# Patient Record
Sex: FEMALE
[De-identification: names, ages, dates, MRNs, and addresses within clinical notes are randomized per-mention and may not be internally consistent; named-entity substitution may affect disease eponyms.]

---

## 2019-02-27 ENCOUNTER — RESULT REVIEW (OUTPATIENT)
Age: 59
End: 2019-02-27

## 2020-07-04 ENCOUNTER — HOSPITAL ENCOUNTER (EMERGENCY)
Facility: HOSPITAL | Age: 60
Discharge: HOME | End: 2020-07-04
Attending: EMERGENCY MEDICINE
Payer: COMMERCIAL

## 2020-07-04 ENCOUNTER — APPOINTMENT (EMERGENCY)
Dept: CT IMAGING | Facility: HOSPITAL | Age: 60
End: 2020-07-04
Payer: COMMERCIAL

## 2020-07-04 VITALS
WEIGHT: 132.72 LBS | DIASTOLIC BLOOD PRESSURE: 68 MMHG | RESPIRATION RATE: 18 BRPM | HEART RATE: 63 BPM | OXYGEN SATURATION: 98 % | HEIGHT: 60 IN | BODY MASS INDEX: 26.06 KG/M2 | SYSTOLIC BLOOD PRESSURE: 116 MMHG | TEMPERATURE: 98.8 F

## 2020-07-04 DIAGNOSIS — R10.9 ABDOMINAL PAIN: Primary | ICD-10-CM

## 2020-07-04 DIAGNOSIS — N20.0 KIDNEY STONE: ICD-10-CM

## 2020-07-04 LAB
ALBUMIN SERPL BCP-MCNC: 3.8 G/DL (ref 3.5–5)
ALP SERPL-CCNC: 85 U/L (ref 46–116)
ALT SERPL W P-5'-P-CCNC: 25 U/L (ref 12–78)
AMORPH PHOS CRY URNS QL MICRO: ABNORMAL /HPF
ANION GAP SERPL CALCULATED.3IONS-SCNC: 9 MMOL/L (ref 4–13)
AST SERPL W P-5'-P-CCNC: 18 U/L (ref 5–45)
BACTERIA UR QL AUTO: ABNORMAL /HPF
BASOPHILS # BLD AUTO: 0.05 THOUSANDS/ΜL (ref 0–0.1)
BASOPHILS NFR BLD AUTO: 0 % (ref 0–1)
BILIRUB SERPL-MCNC: 0.4 MG/DL (ref 0.2–1)
BILIRUB UR QL STRIP: NEGATIVE
BUN SERPL-MCNC: 9 MG/DL (ref 5–25)
CALCIUM SERPL-MCNC: 8.7 MG/DL (ref 8.3–10.1)
CHLORIDE SERPL-SCNC: 98 MMOL/L (ref 100–108)
CLARITY UR: CLEAR
CO2 SERPL-SCNC: 25 MMOL/L (ref 21–32)
COLOR UR: YELLOW
CREAT SERPL-MCNC: 0.89 MG/DL (ref 0.6–1.3)
EOSINOPHIL # BLD AUTO: 0.01 THOUSAND/ΜL (ref 0–0.61)
EOSINOPHIL NFR BLD AUTO: 0 % (ref 0–6)
ERYTHROCYTE [DISTWIDTH] IN BLOOD BY AUTOMATED COUNT: 12.2 % (ref 11.6–15.1)
GFR SERPL CREATININE-BSD FRML MDRD: 72 ML/MIN/1.73SQ M
GLUCOSE SERPL-MCNC: 138 MG/DL (ref 65–140)
GLUCOSE UR STRIP-MCNC: ABNORMAL MG/DL
HCT VFR BLD AUTO: 40.1 % (ref 34.8–46.1)
HGB BLD-MCNC: 13.4 G/DL (ref 11.5–15.4)
HGB UR QL STRIP.AUTO: ABNORMAL
IMM GRANULOCYTES # BLD AUTO: 0.05 THOUSAND/UL (ref 0–0.2)
IMM GRANULOCYTES NFR BLD AUTO: 0 % (ref 0–2)
KETONES UR STRIP-MCNC: ABNORMAL MG/DL
LEUKOCYTE ESTERASE UR QL STRIP: NEGATIVE
LIPASE SERPL-CCNC: 98 U/L (ref 73–393)
LYMPHOCYTES # BLD AUTO: 1.41 THOUSANDS/ΜL (ref 0.6–4.47)
LYMPHOCYTES NFR BLD AUTO: 10 % (ref 14–44)
MCH RBC QN AUTO: 29.3 PG (ref 26.8–34.3)
MCHC RBC AUTO-ENTMCNC: 33.4 G/DL (ref 31.4–37.4)
MCV RBC AUTO: 88 FL (ref 82–98)
MONOCYTES # BLD AUTO: 0.75 THOUSAND/ΜL (ref 0.17–1.22)
MONOCYTES NFR BLD AUTO: 5 % (ref 4–12)
NEUTROPHILS # BLD AUTO: 12.25 THOUSANDS/ΜL (ref 1.85–7.62)
NEUTS SEG NFR BLD AUTO: 85 % (ref 43–75)
NITRITE UR QL STRIP: NEGATIVE
NON-SQ EPI CELLS URNS QL MICRO: ABNORMAL /HPF
NRBC BLD AUTO-RTO: 0 /100 WBCS
PH UR STRIP.AUTO: 8.5 [PH] (ref 4.5–8)
PLATELET # BLD AUTO: 204 THOUSANDS/UL (ref 149–390)
PMV BLD AUTO: 10.8 FL (ref 8.9–12.7)
POTASSIUM SERPL-SCNC: 3 MMOL/L (ref 3.5–5.3)
PROT SERPL-MCNC: 7.6 G/DL (ref 6.4–8.2)
PROT UR STRIP-MCNC: NEGATIVE MG/DL
RBC # BLD AUTO: 4.58 MILLION/UL (ref 3.81–5.12)
RBC #/AREA URNS AUTO: ABNORMAL /HPF
SODIUM SERPL-SCNC: 132 MMOL/L (ref 136–145)
SP GR UR STRIP.AUTO: 1.02 (ref 1–1.03)
UROBILINOGEN UR QL STRIP.AUTO: 0.2 E.U./DL
WBC # BLD AUTO: 14.52 THOUSAND/UL (ref 4.31–10.16)
WBC #/AREA URNS AUTO: ABNORMAL /HPF

## 2020-07-04 PROCEDURE — 99285 EMERGENCY DEPT VISIT HI MDM: CPT | Performed by: PHYSICIAN ASSISTANT

## 2020-07-04 PROCEDURE — 80053 COMPREHEN METABOLIC PANEL: CPT | Performed by: PHYSICIAN ASSISTANT

## 2020-07-04 PROCEDURE — 81001 URINALYSIS AUTO W/SCOPE: CPT

## 2020-07-04 PROCEDURE — 36415 COLL VENOUS BLD VENIPUNCTURE: CPT | Performed by: PHYSICIAN ASSISTANT

## 2020-07-04 PROCEDURE — 87086 URINE CULTURE/COLONY COUNT: CPT | Performed by: PHYSICIAN ASSISTANT

## 2020-07-04 PROCEDURE — 83690 ASSAY OF LIPASE: CPT | Performed by: PHYSICIAN ASSISTANT

## 2020-07-04 PROCEDURE — 99284 EMERGENCY DEPT VISIT MOD MDM: CPT

## 2020-07-04 PROCEDURE — 96375 TX/PRO/DX INJ NEW DRUG ADDON: CPT

## 2020-07-04 PROCEDURE — 85025 COMPLETE CBC W/AUTO DIFF WBC: CPT | Performed by: PHYSICIAN ASSISTANT

## 2020-07-04 PROCEDURE — 96374 THER/PROPH/DIAG INJ IV PUSH: CPT

## 2020-07-04 PROCEDURE — 74177 CT ABD & PELVIS W/CONTRAST: CPT

## 2020-07-04 PROCEDURE — 81003 URINALYSIS AUTO W/O SCOPE: CPT

## 2020-07-04 RX ORDER — POTASSIUM CHLORIDE 20 MEQ/1
40 TABLET, EXTENDED RELEASE ORAL ONCE
Status: COMPLETED | OUTPATIENT
Start: 2020-07-04 | End: 2020-07-04

## 2020-07-04 RX ORDER — KETOROLAC TROMETHAMINE 30 MG/ML
15 INJECTION, SOLUTION INTRAMUSCULAR; INTRAVENOUS ONCE
Status: COMPLETED | OUTPATIENT
Start: 2020-07-04 | End: 2020-07-04

## 2020-07-04 RX ORDER — OXYCODONE HYDROCHLORIDE 5 MG/1
5 TABLET ORAL EVERY 4 HOURS PRN
Qty: 6 TABLET | Refills: 0 | Status: SHIPPED | OUTPATIENT
Start: 2020-07-04 | End: 2020-07-14

## 2020-07-04 RX ORDER — ONDANSETRON 4 MG/1
4 TABLET, FILM COATED ORAL EVERY 6 HOURS
Qty: 12 TABLET | Refills: 0 | Status: SHIPPED | OUTPATIENT
Start: 2020-07-04

## 2020-07-04 RX ORDER — OXYCODONE HYDROCHLORIDE 5 MG/1
5 TABLET ORAL EVERY 4 HOURS PRN
Qty: 6 TABLET | Refills: 0 | Status: SHIPPED | OUTPATIENT
Start: 2020-07-04 | End: 2020-07-04 | Stop reason: SDUPTHER

## 2020-07-04 RX ORDER — ONDANSETRON 4 MG/1
4 TABLET, FILM COATED ORAL EVERY 6 HOURS
Qty: 12 TABLET | Refills: 0 | Status: SHIPPED | OUTPATIENT
Start: 2020-07-04 | End: 2020-07-04 | Stop reason: SDUPTHER

## 2020-07-04 RX ORDER — ONDANSETRON 2 MG/ML
4 INJECTION INTRAMUSCULAR; INTRAVENOUS ONCE
Status: COMPLETED | OUTPATIENT
Start: 2020-07-04 | End: 2020-07-04

## 2020-07-04 RX ADMIN — KETOROLAC TROMETHAMINE 15 MG: 30 INJECTION, SOLUTION INTRAMUSCULAR at 17:11

## 2020-07-04 RX ADMIN — ONDANSETRON 4 MG: 2 INJECTION INTRAMUSCULAR; INTRAVENOUS at 17:11

## 2020-07-04 RX ADMIN — POTASSIUM CHLORIDE 40 MEQ: 1500 TABLET, EXTENDED RELEASE ORAL at 18:18

## 2020-07-04 RX ADMIN — IOHEXOL 100 ML: 350 INJECTION, SOLUTION INTRAVENOUS at 19:05

## 2020-07-04 NOTE — ED PROVIDER NOTES
History  Chief Complaint   Patient presents with    Abdominal Pain     low abdominal pain radiating into back     The patient is a 56-year-old female with history of thyroid disease and hypertension who presents to the emergency department with abdominal pain that started this morning  The patient reports sudden onset of abdominal pain this morning  She reports it is radiating to her back  She states it has been constant since it started  She additionally reports associated nausea and vomiting  The patient reports she tried taking Tylenol several times for the pain, however she just vomited it up each time  She reports that nothing has made the pain better or worse  The patient denies any known sick contacts  The patient denies fever, chills, chest pain, shortness of breath, diarrhea, hematochezia, melena, dysuria or hematuria  History provided by:  Patient   used: No    Abdominal Pain   Associated symptoms: nausea and vomiting    Associated symptoms: no chest pain, no chills, no cough, no diarrhea, no dysuria, no fever, no hematuria, no shortness of breath and no sore throat        None       Past Medical History:   Diagnosis Date    Disease of thyroid gland     Hypertension        Past Surgical History:   Procedure Laterality Date    THYROID SURGERY         History reviewed  No pertinent family history  I have reviewed and agree with the history as documented  E-Cigarette/Vaping    E-Cigarette Use Never User      E-Cigarette/Vaping Substances     Social History     Tobacco Use    Smoking status: Never Smoker    Smokeless tobacco: Never Used   Substance Use Topics    Alcohol use: Never     Frequency: Never    Drug use: Never       Review of Systems   Constitutional: Negative for chills and fever  HENT: Negative for ear pain and sore throat  Eyes: Negative for redness and visual disturbance  Respiratory: Negative for cough and shortness of breath  Cardiovascular: Negative for chest pain  Gastrointestinal: Positive for abdominal pain, nausea and vomiting  Negative for diarrhea  Genitourinary: Negative for dysuria and hematuria  Musculoskeletal: Positive for back pain  Negative for neck pain and neck stiffness  Skin: Negative for color change and rash  Neurological: Negative for dizziness, light-headedness and headaches  All other systems reviewed and are negative  Physical Exam  Physical Exam   Constitutional: She is oriented to person, place, and time  She appears well-developed and well-nourished  Non-toxic appearance  She does not have a sickly appearance  She does not appear ill  No distress  HENT:   Head: Normocephalic and atraumatic  Mouth/Throat: Uvula is midline, oropharynx is clear and moist and mucous membranes are normal    Eyes: Conjunctivae and lids are normal    Neck: Normal range of motion  Neck supple  Cardiovascular: Normal rate, regular rhythm and normal heart sounds  Pulmonary/Chest: Effort normal and breath sounds normal    Abdominal: Soft  She exhibits no distension  There is tenderness in the right lower quadrant, suprapubic area and left lower quadrant  There is no CVA tenderness  Neurological: She is alert and oriented to person, place, and time  Skin: Skin is warm and dry  Nursing note and vitals reviewed        Vital Signs  ED Triage Vitals [07/04/20 1652]   Temperature Pulse Respirations Blood Pressure SpO2   98 8 °F (37 1 °C) 66 18 141/77 100 %      Temp Source Heart Rate Source Patient Position - Orthostatic VS BP Location FiO2 (%)   Oral Monitor Sitting Right arm --      Pain Score       7           Vitals:    07/04/20 1652 07/04/20 1943 07/04/20 2042   BP: 141/77 117/68 116/68   Pulse: 66 59 63   Patient Position - Orthostatic VS: Sitting Lying          Visual Acuity      ED Medications  Medications   ketorolac (TORADOL) injection 15 mg (15 mg Intravenous Given 7/4/20 1711)   ondansetron (ZOFRAN) injection 4 mg (4 mg Intravenous Given 7/4/20 1711)   potassium chloride (K-DUR,KLOR-CON) CR tablet 40 mEq (40 mEq Oral Given 7/4/20 1818)   iohexol (OMNIPAQUE) 350 MG/ML injection (MULTI-DOSE) 100 mL (100 mL Intravenous Given 7/4/20 1905)       Diagnostic Studies  Results Reviewed     Procedure Component Value Units Date/Time    Urine culture [[id number]] Collected:  07/04/20 1829    Lab Status:   In process Specimen:  Urine Updated:  07/04/20 2025    Urine Microscopic [[id number]]  (Abnormal) Collected:  07/04/20 1829    Lab Status:  Final result Specimen:  Urine, Clean Catch Updated:  07/04/20 1838     RBC, UA 1-2 /hpf      WBC, UA 1-2 /hpf      Epithelial Cells Occasional /hpf      Bacteria, UA Occasional /hpf      AMORPH PHOSPATES Occasional /hpf     Urine Macroscopic, POC [[id number]]  (Abnormal) Collected:  07/04/20 1829    Lab Status:  Final result Specimen:  Urine Updated:  07/04/20 1815     Color, UA Yellow     Clarity, UA Clear     pH, UA 8 5     Leukocytes, UA Negative     Nitrite, UA Negative     Protein, UA Negative mg/dl      Glucose,  (1/10%) mg/dl      Ketones, UA 15 (1+) mg/dl      Urobilinogen, UA 0 2 E U /dl      Bilirubin, UA Negative     Blood, UA Small     Specific [place], UA 1 020    Narrative:       CLINITEK RESULT    Lipase [[id number]]  (Normal) Collected:  07/04/20 1711    Lab Status:  Final result Specimen:  Blood from Arm, Left Updated:  07/04/20 1735     Lipase 98 u/L     Comprehensive metabolic panel [[id number]]  (Abnormal) Collected:  07/04/20 1711    Lab Status:  Final result Specimen:  Blood from Arm, Left Updated:  07/04/20 1735     Sodium 132 mmol/L      Potassium 3 0 mmol/L      Chloride 98 mmol/L      CO2 25 mmol/L      ANION GAP 9 mmol/L      BUN 9 mg/dL      Creatinine 0 89 mg/dL      Glucose 138 mg/dL      Calcium 8 7 mg/dL      AST 18 U/L      ALT 25 U/L      Alkaline Phosphatase 85 U/L      Total Protein 7 6 g/dL      Albumin 3 8 g/dL      Total Bilirubin 0 40 mg/dL eGFR 72 ml/min/1 73sq m     Narrative:       [place] guidelines for Chronic Kidney Disease (CKD):     Stage 1 with normal or high GFR (GFR > 90 mL/min/1 73 square meters)    Stage 2 Mild CKD (GFR = 60-89 mL/min/1 73 square meters)    Stage 3A Moderate CKD (GFR = 45-59 mL/min/1 73 square meters)    Stage 3B Moderate CKD (GFR = 30-44 mL/min/1 73 square meters)    Stage 4 Severe CKD (GFR = 15-29 mL/min/1 73 square meters)    Stage 5 End Stage CKD (GFR <15 mL/min/1 73 square meters)  Note: GFR calculation is accurate only with a steady state creatinine    CBC and differential [[id number]]  (Abnormal) Collected:  07/04/20 1711    Lab Status:  Final result Specimen:  Blood from Arm, Left Updated:  07/04/20 1722     WBC 14 52 Thousand/uL      RBC 4 58 Million/uL      Hemoglobin 13 4 g/dL      Hematocrit 40 1 %      MCV 88 fL      MCH 29 3 pg      MCHC 33 4 g/dL      RDW 12 2 %      MPV 10 8 fL      Platelets 759 Thousands/uL      nRBC 0 /100 WBCs      Neutrophils Relative 85 %      Immat GRANS % 0 %      Lymphocytes Relative 10 %      Monocytes Relative 5 %      Eosinophils Relative 0 %      Basophils Relative 0 %      Neutrophils Absolute 12 25 Thousands/µL      Immature Grans Absolute 0 05 Thousand/uL      Lymphocytes Absolute 1 41 Thousands/µL      Monocytes Absolute 0 75 Thousand/µL      Eosinophils Absolute 0 01 Thousand/µL      Basophils Absolute 0 05 Thousands/µL                  CT abdomen pelvis with contrast   Final Result by Tonia Bond MD (07/04 2005)         1  No acute abnormality identified in the abdomen or pelvis  2   Mild left perinephric stranding  2 mm calculus in the urinary bladder which may represent a recently passed calculus presumably from the left kidney  Nonobstructing 3 mm left lower pole renal collecting system calculus  3   Additional findings as noted  The study was marked in [facility] for immediate notification              Workstation performed: [id number]         CT recon only lumbar spine (No Charge)   Final Result by Elisa Jin MD (07/04 2016)         1  No acute fracture or subluxation noted in the lumbar spine  2   Mild disc space narrowing at L4-5 with some gas in the disc space and some reactive endplate change on both sides of the disc space likely degenerative in nature  A chronic infectious/inflammatory process is felt to be much less likely  No    paraspinal inflammatory or epidural inflammatory process noted  If clinically warranted, further evaluation with MRI may be useful  The study was marked in [facility] for immediate notification  Workstation performed: [id number]                    Procedures  Procedures         ED Course       US AUDIT      Most Recent Value   Initial Alcohol Screen: US AUDIT-C    1  How often do you have a drink containing alcohol?  0 Filed at: 07/04/2020 1653   2  How many drinks containing alcohol do you have on a typical day you are drinking? 0 Filed at: 07/04/2020 1653   3a  Male UNDER 65: How often do you have five or more drinks on one occasion? 0 Filed at: 07/04/2020 1653   3b  FEMALE Any Age, or MALE 65+: How often do you have 4 or more drinks on one occassion? 0 Filed at: 07/04/2020 1653   Audit-C Score  0 Filed at: 07/04/2020 1653                  BRENDON/DAST-10      Most Recent Value   How many times in the past year have you    Used an illegal drug or used a prescription medication for non-medical reasons? Never Filed at: 07/04/2020 1653                                MDM  Number of Diagnoses or Management Options  Abdominal pain: new and requires workup  Kidney stone: new and requires workup  Diagnosis management comments: Patient presents with lower abdominal pain that started this morning  Patient reports associated nausea and vomiting      Differential includes but is not limited to appendicitis versus pyelonephritis versus cystitis versus diverticulitis versus bowel obstruction versus torsion  Labs, imaging and meds ordered  Labs reviewed with no significant findings  Patient reports significant improvement of pain following Zofran and Toradol  CT shows findings consistent with probable kidney stone that has now passed into the bladder  There is mild left perinephric stranding  UA not consistent with infection  Kidney function is within normal limits  All results were discussed with the patient including incidental findings on lumbar spine  She was advised to follow up with her primary care doctor for further management of back pain and possible referral for MRI  Final re-evaluation, the patient reports continuing significant improvement of pain  She is eating and drinking without issue  Will prescribe the patient a short course of Zofran and pain meds for home  Patient advised only to use oxycodone in the event that Tylenol or Motrin does not work  She was advised that this may make her sleepy  She should not drive after taking oxycodone  Urology follow-up advised  Information for Urology provided  Patient given strict return to ED precautions for significantly worsening symptoms or if she develops fever  Patient is stable for discharge         Amount and/or Complexity of Data Reviewed  Clinical lab tests: ordered and reviewed  Tests in the radiology section of CPT®: ordered and reviewed  Decide to obtain previous medical records or to obtain history from someone other than the patient: yes  Review and summarize past medical records: yes    Risk of Complications, Morbidity, and/or Mortality  Presenting problems: low  Diagnostic procedures: low  Management options: low    Patient Progress  Patient progress: stable        Disposition  Final diagnoses:   Abdominal pain   Kidney stone     Time reflects when diagnosis was documented in both MDM as applicable and the Disposition within this note     Time User Action Codes Description Comment    7/4/2020  8:22 PM Joshua Offer Add [R10 9] Abdominal pain     7/4/2020  8:22 PM Joshua Offer Add [N20 0] Kidney stone       ED Disposition     ED Disposition Condition Date/Time Comment    Discharge Stable Sat Jul 4, 2020  8:22 PM Birdie George discharge to home/self care  Follow-up Information     Follow up With Specialties Details Why Contact Info Additional [address] [facility] Schedule an appointment as soon as possible for a visit in 1 week  [address] [place] [address]  [phone] [facility], [address], [place], [address] [facility] [address] Emergency Department Emergency Medicine  If symptoms worsen [address] [facility] [address], [address], [place], [place], [address]          Discharge Medication List as of 7/4/2020  8:29 PM      START taking these medications    Details   ondansetron (ZOFRAN) 4 mg tablet Take 1 tablet (4 mg total) by mouth every 6 (six) hours, Starting Sat 7/4/2020, Normal      oxyCODONE (ROXICODONE) 5 mg immediate release tablet Take 1 tablet (5 mg total) by mouth every 4 (four) hours as needed for moderate pain for up to 10 daysMax Daily Amount: 30 mg, Starting Sat 7/4/2020, Until Tue 7/14/2020, Normal           No discharge procedures on file      PDMP Review     None          ED Provider  Electronically Signed by           Jazmín Cantrell PA-C  07/04/20 2054       Jazmín Cantrell PA-C  07/04/20 2055       Jazmín Cantrell PA-C  07/04/20 2055

## 2020-07-05 LAB — BACTERIA UR CULT: NORMAL

## 2020-07-05 NOTE — ED NOTES
Telephone call from patient  Questions in regards to which pharmacy her prescriptions went to  Pt reports calling pharmacy and states they were not there        Humberto Wheatley RN  07/05/20 8358

## 2023-12-18 ENCOUNTER — TELEPHONE (OUTPATIENT)
Age: 63
End: 2023-12-18

## 2023-12-18 NOTE — TELEPHONE ENCOUNTER
Patients GI provider:  Dr. Tao    Number to return call: [phone]    Reason for call: Pt called to reschedule but didn't want to wait til 01/31/24 so she kept her appt on 12/21/23     Scheduled procedure/appointment date if applicable: Appt 12/21/23

## 2024-02-23 ENCOUNTER — OFFICE VISIT (OUTPATIENT)
Dept: GASTROENTEROLOGY | Facility: CLINIC | Age: 64
End: 2024-02-23
Payer: COMMERCIAL

## 2024-02-23 VITALS
DIASTOLIC BLOOD PRESSURE: 81 MMHG | SYSTOLIC BLOOD PRESSURE: 121 MMHG | HEART RATE: 76 BPM | WEIGHT: 115.8 LBS | HEIGHT: 60 IN | BODY MASS INDEX: 22.74 KG/M2

## 2024-02-23 DIAGNOSIS — R10.10 PAIN OF UPPER ABDOMEN: Primary | ICD-10-CM

## 2024-02-23 DIAGNOSIS — R10.13 DYSPEPSIA: ICD-10-CM

## 2024-02-23 DIAGNOSIS — K92.89 GAS BLOAT SYNDROME: ICD-10-CM

## 2024-02-23 PROCEDURE — 99204 OFFICE O/P NEW MOD 45 MIN: CPT | Performed by: INTERNAL MEDICINE

## 2024-02-23 RX ORDER — AMLODIPINE BESYLATE 5 MG/1
5 TABLET ORAL DAILY
COMMUNITY
Start: 2023-12-07 | End: 2024-12-06

## 2024-02-23 RX ORDER — LEVOTHYROXINE SODIUM 0.07 MG/1
75 TABLET ORAL EVERY OTHER DAY
COMMUNITY
Start: 2024-02-02

## 2024-02-23 RX ORDER — LEVOTHYROXINE SODIUM 0.05 MG/1
50 TABLET ORAL EVERY OTHER DAY
COMMUNITY
Start: 2024-02-08

## 2024-02-23 NOTE — PROGRESS NOTES
[facility] [place] - Outpatient Consultation  Birdie Angeles 63 y.o. female MRN: [id number]  Encounter: [id number]          ASSESSMENT AND PLAN:      1. Pain of upper abdomen  -     esomeprazole (NexIUM) 20 mg capsule; Take 1 capsule (20 mg total) by mouth daily before breakfast    2. Gas bloat syndrome    3. Dyspepsia  -     esomeprazole (NexIUM) 20 mg capsule; Take 1 capsule (20 mg total) by mouth daily before breakfast      History of upper abdominal discomfort bloating dyspepsia, improved with pantoprazole, EGD unremarkable.  Antireflux measures reviewed diet discussed, avoid greasy fried fatty acid and spicy foods.  She does not drink any dairy milk.  If symptoms recur she can try a smaller dose of PPI.  Sometimes Gaviscon may help with symptomatic relief.  She will call us as needed.  Hpy neg    Colonoscopy December 4, 2020 had small polyp internal hemorrhoid and diverticulosis done at [facility].    ______________________________________________________________________    HPI:      Patient came in to discuss her upper GI symptoms, she complains of fullness bloating indigestion gassy feeling, sometimes discomfort in the upper abdomen, had an EGD done generally unremarkable H. pylori negative, was given pantoprazole for 90 days by a GI physician at [facility], she did feel better, has completed 90-day course about 2 weeks ago, symptoms are improved but she is concerned if they come back she prefers to follow-up with here.  Appetite fair weight stable she denies any dysphagia coughing choking spells GI bleeding melena hematochezia chest pain shortness of breath fever chills rash.  Reasonably active.  Some of the prior records noted.  Personal history of thyroid cancer.  Diet medications more than 10 system reviewed, originally from [place].  Lives with her , and has no children.      REVIEW OF SYSTEMS:    CONSTITUTIONAL: Denies any fever, chills, rigors, and weight  loss.  HEENT: No earache or tinnitus.  CARDIOVASCULAR: No chest pain or palpitations.   RESPIRATORY: Denies any cough, hemoptysis, shortness of breath or dyspnea on exertion.  GASTROINTESTINAL: As noted in the History of Present Illness.   GENITOURINARY: Denies any hematuria or dysuria.  NEUROLOGIC: No dizziness or vertigo.   MUSCULOSKELETAL: Denies any joint swellings.  SKIN: Denies skin rashes or itching.   ENDOCRINE: Denies excessive thirst. Denies intolerance to heat or cold.  PSYCHOSOCIAL: Denies depression or anxiety. Denies any recent memory loss.       Historical Information   Past Medical History:   Diagnosis Date   • Cancer (HCC) 2019    Thyroid   • Disease of thyroid gland    • Hypertension      Past Surgical History:   Procedure Laterality Date   • THYROID SURGERY       Social History   Social History     Substance and Sexual Activity   Alcohol Use Never     Social History     Substance and Sexual Activity   Drug Use Never     Social History     Tobacco Use   Smoking Status Never   Smokeless Tobacco Never     History reviewed. No pertinent family history.    Meds/Allergies       Current Outpatient Medications:   •  amLODIPine (NORVASC) 5 mg tablet  •  esomeprazole (NexIUM) 20 mg capsule  •  levothyroxine 50 mcg tablet  •  levothyroxine 75 mcg tablet  •  ondansetron (ZOFRAN) 4 mg tablet    Allergies   Allergen Reactions   • Spironolactone GI Intolerance   • Beef-Derived Products - Food Allergy    • Beef (Bovine) Protein - Food Allergy Rash           Objective     Blood pressure 121/81, pulse 76, height 5' (1.524 m), weight 52.5 kg (115 lb 12.8 oz). Body mass index is 22.62 kg/m².        PHYSICAL EXAM:      General Appearance:   Alert, cooperative, no distress   HEENT:   Normocephalic, atraumatic, anicteric.     Neck:  Supple, symmetrical, trachea midline   Lungs:   Clear to auscultation bilaterally; no rales, rhonchi or wheezing; respirations unlabored    Heart::   Regular rate and rhythm; no murmur.    Abdomen:   Soft, non-tender, non-distended; normal bowel sounds; no masses, no organomegaly    Genitalia:   Deferred    Rectal:   Deferred    Extremities:  No cyanosis, clubbing or edema    Skin:  No jaundice, rashes, or lesions    Lymph nodes:  No palpable cervical lymphadenopathy        Lab Results:   No visits with results within 1 Day(s) from this visit.   Latest known visit with results is:   Admission on 07/04/2020, Discharged on 07/04/2020   Component Date Value   • WBC 07/04/2020 14.52 (H)    • RBC 07/04/2020 4.58    • Hemoglobin 07/04/2020 13.4    • Hematocrit 07/04/2020 40.1    • MCV 07/04/2020 88    • MCH 07/04/2020 29.3    • MCHC 07/04/2020 33.4    • RDW 07/04/2020 12.2    • MPV 07/04/2020 10.8    • Platelets 07/04/2020 204    • nRBC 07/04/2020 0    • Neutrophils Relative 07/04/2020 85 (H)    • Immat GRANS % 07/04/2020 0    • Lymphocytes Relative 07/04/2020 10 (L)    • Monocytes Relative 07/04/2020 5    • Eosinophils Relative 07/04/2020 0    • Basophils Relative 07/04/2020 0    • Neutrophils Absolute 07/04/2020 12.25 (H)    • Immature Grans Absolute 07/04/2020 0.05    • Lymphocytes Absolute 07/04/2020 1.41    • Monocytes Absolute 07/04/2020 0.75    • Eosinophils Absolute 07/04/2020 0.01    • Basophils Absolute 07/04/2020 0.05    • Sodium 07/04/2020 132 (L)    • Potassium 07/04/2020 3.0 (L)    • Chloride 07/04/2020 98 (L)    • CO2 07/04/2020 25    • ANION GAP 07/04/2020 9    • BUN 07/04/2020 9    • Creatinine 07/04/2020 0.89    • Glucose 07/04/2020 138    • Calcium 07/04/2020 8.7    • AST 07/04/2020 18    • ALT 07/04/2020 25    • Alkaline Phosphatase 07/04/2020 85    • Total Protein 07/04/2020 7.6    • Albumin 07/04/2020 3.8    • Total Bilirubin 07/04/2020 0.40    • eGFR 07/04/2020 72    • Lipase 07/04/2020 98    • Color, UA 07/04/2020 Yellow    • Clarity, UA 07/04/2020 Clear    • pH, UA 07/04/2020 8.5 (H)    • Leukocytes, UA 07/04/2020 Negative    • Nitrite, UA 07/04/2020 Negative    • Protein, UA  07/04/2020 Negative    • Glucose, UA 07/04/2020 100 (1/10%) (A)    • Ketones, UA 07/04/2020 15 (1+) (A)    • Urobilinogen, UA 07/04/2020 0.2    • Bilirubin, UA 07/04/2020 Negative    • Occult Blood, UA 07/04/2020 Small (A)    • Specific Gravity, UA 07/04/2020 1.020    • RBC, UA 07/04/2020 1-2 (A)    • WBC, UA 07/04/2020 1-2 (A)    • Epithelial Cells 07/04/2020 Occasional    • Bacteria, UA 07/04/2020 Occasional    • AMORPH PHOSPATES 07/04/2020 Occasional    • Urine Culture 07/04/2020 10,000-19,000 cfu/ml          Radiology Results:   No results found.

## 2024-02-24 DIAGNOSIS — R10.10 PAIN OF UPPER ABDOMEN: ICD-10-CM

## 2024-02-24 DIAGNOSIS — R10.13 DYSPEPSIA: ICD-10-CM

## 2025-06-11 DIAGNOSIS — R10.13 DYSPEPSIA: ICD-10-CM

## 2025-06-11 DIAGNOSIS — R10.10 PAIN OF UPPER ABDOMEN: ICD-10-CM

## 2025-06-11 NOTE — TELEPHONE ENCOUNTER
Lmm for pt to call back . Pt needs to schedule an ov. Please verify subscriber for pt's insurance. Thanks sb

## 2025-06-11 NOTE — TELEPHONE ENCOUNTER
Pt called and said she will call back to make an OV. I tried to ask the pts insurance subscriber but the pt ended the call